# Patient Record
Sex: FEMALE | Race: WHITE | Employment: STUDENT | URBAN - METROPOLITAN AREA
[De-identification: names, ages, dates, MRNs, and addresses within clinical notes are randomized per-mention and may not be internally consistent; named-entity substitution may affect disease eponyms.]

---

## 2017-03-14 ENCOUNTER — HOSPITAL ENCOUNTER (OUTPATIENT)
Age: 22
Discharge: HOME | End: 2017-03-14
Payer: COMMERCIAL

## 2017-03-14 VITALS
OXYGEN SATURATION: 98 % | BODY MASS INDEX: 22.18 KG/M2 | WEIGHT: 138 LBS | HEIGHT: 66 IN | SYSTOLIC BLOOD PRESSURE: 118 MMHG | HEART RATE: 81 BPM | DIASTOLIC BLOOD PRESSURE: 67 MMHG | RESPIRATION RATE: 18 BRPM | TEMPERATURE: 98 F

## 2017-03-14 DIAGNOSIS — J06.9 VIRAL UPPER RESPIRATORY TRACT INFECTION WITH COUGH: Primary | ICD-10-CM

## 2017-03-14 LAB
POCT RAPID STREP: NEGATIVE
POCT URINE PREGNANCY: NEGATIVE

## 2017-03-14 PROCEDURE — 99204 OFFICE O/P NEW MOD 45 MIN: CPT

## 2017-03-14 PROCEDURE — 87430 STREP A AG IA: CPT | Performed by: PHYSICIAN ASSISTANT

## 2017-03-14 PROCEDURE — 81025 URINE PREGNANCY TEST: CPT | Performed by: PHYSICIAN ASSISTANT

## 2017-03-14 PROCEDURE — 87081 CULTURE SCREEN ONLY: CPT | Performed by: PHYSICIAN ASSISTANT

## 2017-03-14 RX ORDER — IBUPROFEN 600 MG/1
600 TABLET ORAL ONCE
Status: COMPLETED | OUTPATIENT
Start: 2017-03-14 | End: 2017-03-14

## 2017-03-14 RX ORDER — FLUTICASONE PROPIONATE 50 MCG
2 SPRAY, SUSPENSION (ML) NASAL DAILY
Qty: 16 G | Refills: 0 | Status: SHIPPED | OUTPATIENT
Start: 2017-03-14 | End: 2017-04-13

## 2017-03-14 RX ORDER — IBUPROFEN 200 MG
200 TABLET ORAL EVERY 6 HOURS PRN
COMMUNITY

## 2017-03-14 NOTE — ED INITIAL ASSESSMENT (HPI)
Started with fever Sunday night. Dry cough, some nasal stuffiness, occasional nasal drainage. Some post nasal drip. Headaches & some nausea. Did get a seasonal flu vaccine.

## 2017-03-14 NOTE — ED PROVIDER NOTES
Patient Seen in: [facility] In [facility]    History   Patient presents with:  Fever: 101.7  Cough/URI: dry  Headache  Nausea    Stated Complaint: FEVER    HPI    81-year-old female who comes in with mom complaining of sinus pressure, nasal c °C) (Temporal)  Resp 18  Ht 167.6 cm (5' 6\")  Wt 62.596 kg  BMI 22.28 kg/m2  SpO2 98%  LMP 02/01/2017 (Approximate)        Physical Exam      General Appearance:  Alert and orientated x 4, cooperative, no distress, appropriate for age  Head:  [place] [address] [address]  [phone]    If symptoms worsen    Glory Barakat MD  [address] [place] [id number] [phone]      pcp      Medications Prescribed:  Current Discharge Medication List    START taking these medications    Flutica

## 2017-03-18 ENCOUNTER — HOSPITAL ENCOUNTER (OUTPATIENT)
Age: 22
Discharge: HOME | End: 2017-03-18
Payer: COMMERCIAL

## 2017-03-18 VITALS
DIASTOLIC BLOOD PRESSURE: 51 MMHG | RESPIRATION RATE: 18 BRPM | BODY MASS INDEX: 22 KG/M2 | WEIGHT: 138 LBS | OXYGEN SATURATION: 98 % | SYSTOLIC BLOOD PRESSURE: 103 MMHG | HEART RATE: 92 BPM | TEMPERATURE: 99 F

## 2017-03-18 DIAGNOSIS — H10.31 ACUTE CONJUNCTIVITIS OF RIGHT EYE, UNSPECIFIED ACUTE CONJUNCTIVITIS TYPE: Primary | ICD-10-CM

## 2017-03-18 PROCEDURE — 99213 OFFICE O/P EST LOW 20 MIN: CPT

## 2017-03-18 PROCEDURE — 99214 OFFICE O/P EST MOD 30 MIN: CPT

## 2017-03-18 RX ORDER — POLYMYXIN B SULFATE AND TRIMETHOPRIM 1; 10000 MG/ML; [USP'U]/ML
1 SOLUTION OPHTHALMIC EVERY 4 HOURS
Qty: 10 ML | Refills: 0 | Status: SHIPPED | OUTPATIENT
Start: 2017-03-18 | End: 2017-03-22

## 2017-03-18 NOTE — ED PROVIDER NOTES
Patient Seen in: [facility] In [facility]    History   Patient presents with:   Eye Visual Problem (opthalmic)    Stated Complaint: RIGHT RED SWOLLEN EYE    HPI    CHIEF COMPLAINT: Right eye swelling, drainage    HISTORY OF PRESENT ILLNESS: pa are as noted in HPI. Constitutional and vital signs reviewed. All other systems reviewed and negative except as noted above. PSFH elements reviewed from today and agreed except as otherwise stated in HPI.     Physical Exam     ED Triage Vitals   BP Prescribed:  Current Discharge Medication List    START taking these medications    Polymyxin B-Trimethoprim 47980-9.1 UNIT/ML-% Ophthalmic Solution  Place 1 drop into the right eye every 4 (four) hours.   Qty: 10 mL Refills: 0

## (undated) NOTE — ED AVS SNAPSHOT
[facility] in [address] [place] [address] Drive,[address]    [address] [place] [address]    Phone:  [phone]    Fax:  [phone]           Irlanda Canseco   MRN: [id number]    Department:  [facility] in [facility]   Date of Visit:  3/14/2017 directly to the ER for any acute worsening of symptoms. If you smoke, stop smoking. Push oral fluids to help loosen up chest mucous and move it out of your body. Use a cold mist humidifier. Get enough rest and sleep.   Claritin D OR sudafed for conges receive this, we would really appreciate it if you could take the time to complete it. Thank you! You were examined and treated today on an urgent basis only. This was not a substitute for ongoing medical care.  Often, one Immediate Care visit does no 4[address]  [facility] ([address]) [facility] [address] ([address]) [address]  Od (Etha Fearing) 21  [address] ([address]) [address]

## (undated) NOTE — ED AVS SNAPSHOT
[facility] in [address] [place] [address] Drive,[address] Floor    [address] [facility]    Phone:  [phone]    Fax:  [phone]           Kash Herrera   MRN: [id number]    Department:  [facility]   Date of Visit:  3/18/2017 determine coverage for follow-up care and referrals. [facility]  [address] [facility], [address] [place] [address]  [phone] gwendolyn 34  [address]  [phone] [facility]mediate [facility]  [phone] If the Immediate Care Provider has read X-rays, these will be re-interpreted by a radiologist.  If there is a significant change in your reading, you will be contacted. Please make sure we have your correct phone number before you leave.  After you leave, y and ask to get set up for an insurance coverage that is in-network with [employer] [facility]. [employer]     Sign up for [employer], your secure online medical record.   [employer] will allow you to access patient instructions from your recent visit,  view